# Patient Record
Sex: MALE | Race: WHITE | NOT HISPANIC OR LATINO | Employment: OTHER | URBAN - METROPOLITAN AREA
[De-identification: names, ages, dates, MRNs, and addresses within clinical notes are randomized per-mention and may not be internally consistent; named-entity substitution may affect disease eponyms.]

---

## 2019-08-30 ENCOUNTER — OFFICE VISIT (OUTPATIENT)
Dept: URGENT CARE | Age: 69
End: 2019-08-30
Payer: MEDICARE

## 2019-08-30 VITALS
SYSTOLIC BLOOD PRESSURE: 176 MMHG | WEIGHT: 240 LBS | HEIGHT: 72 IN | HEART RATE: 92 BPM | DIASTOLIC BLOOD PRESSURE: 96 MMHG | RESPIRATION RATE: 18 BRPM | BODY MASS INDEX: 32.51 KG/M2 | TEMPERATURE: 97.6 F | OXYGEN SATURATION: 96 %

## 2019-08-30 DIAGNOSIS — M54.16 LUMBAR RADICULOPATHY: Primary | ICD-10-CM

## 2019-08-30 DIAGNOSIS — S39.012A STRAIN OF LUMBAR REGION, INITIAL ENCOUNTER: ICD-10-CM

## 2019-08-30 PROCEDURE — 99203 OFFICE O/P NEW LOW 30 MIN: CPT | Performed by: PHYSICIAN ASSISTANT

## 2019-08-30 PROCEDURE — G0463 HOSPITAL OUTPT CLINIC VISIT: HCPCS | Performed by: PHYSICIAN ASSISTANT

## 2019-08-30 RX ORDER — PEN NEEDLE, DIABETIC 32GX 5/32"
NEEDLE, DISPOSABLE MISCELLANEOUS
COMMUNITY
Start: 2019-07-11

## 2019-08-30 RX ORDER — ASPIRIN 81 MG/1
1 TABLET ORAL DAILY
COMMUNITY
Start: 2010-12-10

## 2019-08-30 RX ORDER — BIOTIN 1 MG
4000 TABLET ORAL DAILY
COMMUNITY
Start: 2012-03-26

## 2019-08-30 RX ORDER — CHLORAL HYDRATE 500 MG
2 CAPSULE ORAL 2 TIMES DAILY
COMMUNITY

## 2019-08-30 RX ORDER — PREDNISONE 50 MG/1
50 TABLET ORAL DAILY
Qty: 5 TABLET | Refills: 0 | Status: SHIPPED | OUTPATIENT
Start: 2019-08-30

## 2019-08-30 RX ORDER — PIOGLITAZONEHYDROCHLORIDE 15 MG/1
15 TABLET ORAL DAILY
COMMUNITY
Start: 2019-03-29 | End: 2020-03-28

## 2019-08-30 RX ORDER — LISINOPRIL 20 MG/1
TABLET ORAL
COMMUNITY
Start: 2019-07-16

## 2019-08-30 RX ORDER — ATORVASTATIN CALCIUM 80 MG/1
80 TABLET, FILM COATED ORAL DAILY
COMMUNITY
Start: 2019-02-25 | End: 2020-02-25

## 2019-08-30 RX ORDER — FENOFIBRATE 145 MG/1
145 TABLET, COATED ORAL DAILY
COMMUNITY
Start: 2019-02-25 | End: 2020-02-25

## 2019-08-30 RX ORDER — ESOMEPRAZOLE MAGNESIUM 40 MG/1
40 CAPSULE, DELAYED RELEASE ORAL DAILY
COMMUNITY
Start: 2017-06-19

## 2019-08-30 NOTE — PATIENT INSTRUCTIONS
Follow up with PCP in 3-5 days  Proceed to  ER if symptoms worsen  Patient will begin steroids as directed  Advised patient that his glucose levels may rise with the steroid use  Keep a close eye on levels  Referral to physical therapy and spine specialist      Acute Low Back Pain, Ambulatory Care   GENERAL INFORMATION:   Acute low back pain  is discomfort in your lower back area that lasts for less than 12 weeks  The word acute is used to describe pain that starts suddenly, worsens quickly, and lasts for a short time  Common symptoms include the following:   · Back stiffness or spasms    · Pain down the back or side of one leg    · Holding yourself in an unusual position or posture to decrease your back pain    · Not being able to find a sitting position that is comfortable    · Slow increase in your pain for 24 to 48 hours after you stress your back    · Tenderness on your lower back or severe pain when you move your back  Seek immediate care for the following symptoms:   · Severe pain    · Sudden stiffness and heaviness in both buttocks down to both legs    · Numbness or weakness in one leg, or pain in both legs    · Numbness in your genital area or across your lower back    · Unable to control your urine or bowel movements  Treatment for acute low back pain  may include any of the following:  · Medicines:      ¨ NSAIDs  help decrease swelling and pain or fever  This medicine is available with or without a doctor's order  NSAIDs can cause stomach bleeding or kidney problems in certain people  If you take blood thinner medicine, always ask your healthcare provider if NSAIDs are safe for you  Always read the medicine label and follow directions  ¨ Muscle relaxers  help decrease muscle spasms pain  ¨ Prescription pain medicine  may be given  Ask how to take this medicine safely  · Surgery  may be needed if your pain is severe and other treatments do not work   Surgery may be needed for conditions of the lumbar spine, such as herniated disc or spinal stenosis  Manage your symptoms:   · Sleep on a firm mattress  If you do not have a firm mattress, have someone move your mattress to the floor for a few days  A piece of plywood under your mattress can also help make it firmer  · Apply ice  on your lower back for 15 to 20 minutes every hour or as directed  Use an ice pack, or put crushed ice in a plastic bag  Cover it with a towel  Ice helps prevent tissue damage and decreases swelling and pain  You can alternate ice and heat  · Apply heat  on your lower back for 20 to 30 minutes every 2 hours for as many days as directed  Heat helps decrease pain and muscle spasms  · Go to physical therapy  A physical therapist teaches you exercises to help improve movement and strength, and to decrease pain  Prevent acute low back pain:   · Use proper body mechanics  ¨ Bend at the hips and knees when you  objects  Do not bend from the waist  Use your leg muscles as you lift the load  Do not use your back  Keep the object close to your chest as you lift it  Try not to twist or lift anything above your waist     ¨ Change your position often when you stand for long periods of time  Rest one foot on a small box or footrest, and then switch to the other foot often  ¨ Try not to sit for long periods of time  When you do, sit in a straight-backed chair with your feet flat on the floor  Never reach, pull, or push while you are sitting  · Exercise regularly  Warm up before you exercise  Do exercises that strengthen your back muscles  Ask about the best exercise plan for you  · Maintain a healthy weight  Ask your healthcare provider how much you should weigh  Ask him to help you create a weight loss plan if you are overweight  Follow up with your healthcare provider as directed:  Return for a follow-up visit if you still have pain after 1 to 3 weeks of treatment   You may need to visit an orthopedist if your back pain lasts more than 6 to 12 weeks  Write down your questions so you remember to ask them during your visits  CARE AGREEMENT:   You have the right to help plan your care  Learn about your health condition and how it may be treated  Discuss treatment options with your caregivers to decide what care you want to receive  You always have the right to refuse treatment  The above information is an  only  It is not intended as medical advice for individual conditions or treatments  Talk to your doctor, nurse or pharmacist before following any medical regimen to see if it is safe and effective for you  © 2014 [address] is for End User's use only and may not be sold, redistributed or otherwise used for commercial purposes  All illustrations and images included in CareNotes® are the copyrighted property of A D A M [employer]  or [employer]

## 2019-08-30 NOTE — PROGRESS NOTES
[facility]        NAME: Shantel Moore is a 80 y o  male  : 1950    MRN: [id number]  DATE: 2019  TIME: 12:14 PM    Assessment and Plan   Lumbar radiculopathy [M54 16]  1  Lumbar radiculopathy  predniSONE 50 mg tablet    Ambulatory referral to Physical Therapy    Ambulatory Referral to [facility]   2  Strain of lumbar region, initial encounter  predniSONE 50 mg tablet    Ambulatory referral to Physical Therapy    Ambulatory Referral to [facility]         Patient Instructions       Follow up with PCP in 3-5 days  Proceed to  ER if symptoms worsen  Patient will begin steroids as directed  Advised patient that his glucose levels may rise with the steroid use  Keep a close eye on levels  Referral to physical therapy and spine specialist      Chief Complaint     Chief Complaint   Patient presents with    Back Pain     Pt c/o dull, aching left lower back pain that radiates down his left thigh and to his knee for the past 5 days  Denies injury  Pt has been taking Advil and ALeve for symptoms  Pt has hx of sciatica  History of Present Illness       Patient is a 79 yo male presenting to the office for an initial evaluation for his lumbar spine  Patient states that the pain has been ongoing for the past 4-5 days with no injury  Patient states that he has a prior history of sciatica which was not treated medically  Patient states that the pain is constant and becomes worse with ROM of the spine and at night  Patient states that the pain does radiate into the left thigh  Patient has been icing the back and has taken ibuprofen with minimal relief  Back Pain   Associated symptoms include paresthesias and tingling  Pertinent negatives include no abdominal pain, bladder incontinence, bowel incontinence, chest pain, fever, headaches, leg pain, numbness, weakness or weight loss         Review of Systems   Review of Systems   Constitutional: Negative for chills, fatigue, fever and weight loss  HENT: Negative  Eyes: Negative  Respiratory: Negative  Cardiovascular: Negative  Negative for chest pain  Gastrointestinal: Negative  Negative for abdominal pain and bowel incontinence  Endocrine: Negative  Genitourinary: Negative  Negative for bladder incontinence  Musculoskeletal: Positive for back pain and myalgias  Negative for arthralgias, joint swelling, neck pain and neck stiffness  Skin: Negative  Allergic/Immunologic: Negative  Neurological: Positive for tingling and paresthesias  Negative for dizziness, tremors, seizures, weakness, numbness and headaches  Hematological: Negative  Psychiatric/Behavioral: Negative  Current Medications       Current Outpatient Medications:     aspirin (ECOTRIN LOW STRENGTH) 81 mg EC tablet, Take 1 tablet by mouth daily, Disp: , Rfl:     atorvastatin (LIPITOR) 80 mg tablet, Take 80 mg by mouth daily, Disp: , Rfl:     Cholecalciferol (VITAMIN D3) 1000 units CAPS, Take 4,000 Units by mouth daily, Disp: , Rfl:     Cyanocobalamin 1000 MCG CAPS, Take 1 tablet by mouth daily, Disp: , Rfl:     esomeprazole (NEXIUM) 40 MG capsule, Take 40 mg by mouth daily, Disp: , Rfl:     fenofibrate (TRICOR) 145 mg tablet, Take 145 mg by mouth daily, Disp: , Rfl:     insulin aspart (NovoLOG) 100 Units/mL injection pen, Inject 32 units with breakfast, 22 units with lunch & 26 units with dinner Dx:E11 65, Disp: , Rfl:     insulin glargine (BASAGLAR KWIKPEN) 100 units/mL injection pen, Inject 86 units daily  E11 65, Disp: , Rfl:     lisinopril (ZESTRIL) 20 mg tablet, TAKE 1 TABLET DAILY, Disp: , Rfl:     metFORMIN (GLUCOPHAGE) 500 mg tablet, Take 2 tabs in the morning with breakfast and 3 tabs in the evening with dinner   E11 65, Disp: , Rfl:     pioglitazone (ACTOS) 15 mg tablet, Take 15 mg by mouth daily, Disp: , Rfl:     sitaGLIPtin-metFORMIN (JANUMET)  MG per tablet, Take 1 tablet by mouth 2 (two) times a day with meals, Disp: , Rfl:     BD PEN NEEDLE BEREKET U/F 32G X 4 MM MISC, , Disp: , Rfl:     lisinopril (ZESTRIL) 20 mg tablet, , Disp: , Rfl:     Omega-3 1000 MG CAPS, Take 2 g by mouth 2 (two) times a day, Disp: , Rfl:     predniSONE 50 mg tablet, Take 1 tablet (50 mg total) by mouth daily, Disp: 5 tablet, Rfl: 0    Current Allergies     Allergies as of 08/30/2019    (No Known Allergies)            The following portions of the patient's history were reviewed and updated as appropriate: allergies, current medications, past family history, past medical history, past social history, past surgical history and problem list      Past Medical History:   Diagnosis Date    Acid reflux     Diabetes mellitus type II, controlled ([facility] [address] )     Hypertension        History reviewed  No pertinent surgical history  History reviewed  No pertinent family history  Medications have been verified  Objective   BP (!) 176/96   Pulse 92   Temp 97 6 °F (36 4 °C)   Resp 18   Ht 5' 11 5" (1 816 m)   Wt 109 kg (240 lb)   SpO2 96%   BMI 33 01 kg/m²        Physical Exam     Physical Exam   Constitutional: He appears well-developed and well-nourished  HENT:   Head: Normocephalic  Neck: Normal range of motion  Cardiovascular: Normal rate, regular rhythm and normal heart sounds  Pulmonary/Chest: Effort normal and breath sounds normal    Abdominal: Soft  Bowel sounds are normal    Musculoskeletal:        Lumbar back: He exhibits decreased range of motion (mild decrease in flexion, pain with rotation ) and tenderness (left paraspinal )  He exhibits no bony tenderness, no swelling and no edema  Back:    Neurological: He is alert  Skin: Skin is warm  Capillary refill takes less than 2 seconds  Psychiatric: He has a normal mood and affect   His behavior is normal  Judgment and thought content normal

## 2019-09-05 ENCOUNTER — TELEPHONE (OUTPATIENT)
Dept: PHYSICAL THERAPY | Facility: OTHER | Age: 69
End: 2019-09-05

## 2019-09-10 ENCOUNTER — TELEPHONE (OUTPATIENT)
Dept: PHYSICAL THERAPY | Facility: OTHER | Age: 69
End: 2019-09-10

## 2019-09-10 NOTE — TELEPHONE ENCOUNTER
Returned patients call and 2nd voice message left for patient to call back  Phone number and hours of business provided

## 2019-09-12 ENCOUNTER — TRANSCRIBE ORDERS (OUTPATIENT)
Dept: ADMINISTRATIVE | Facility: HOSPITAL | Age: 69
End: 2019-09-12

## 2019-09-12 DIAGNOSIS — M54.16 LUMBAR RADICULOPATHY: Primary | ICD-10-CM

## 2019-09-19 ENCOUNTER — HOSPITAL ENCOUNTER (OUTPATIENT)
Dept: MRI IMAGING | Facility: HOSPITAL | Age: 69
Discharge: HOME | End: 2019-09-19
Payer: MEDICARE

## 2019-09-19 DIAGNOSIS — M54.16 LUMBAR RADICULOPATHY: ICD-10-CM

## 2019-09-19 PROCEDURE — 72148 MRI LUMBAR SPINE W/O DYE: CPT

## 2019-09-20 ENCOUNTER — TELEPHONE (OUTPATIENT)
Dept: URGENT CARE | Age: 69
End: 2019-09-20

## 2019-09-20 NOTE — TELEPHONE ENCOUNTER
Julianna Fisher PA-C called primary phone number and left a message for him to call back  Later on a [employer] called saying the phone number did not belong to a "[employer]" and she didn't know them  Julianna Fisher did call the secondary phone number and a woman answered saying there was a [employer] that lived there and would have him call back  At 3:50pm Antony Lux called back while Julianna Fisher was unavailable  Julianna Fisher please call [employer] back at [phone]

## 2019-09-20 NOTE — TELEPHONE ENCOUNTER
Call completed  Patient will be referred to ortho for further evaluation  Discussed results of MRI completely  I never ordered an MRI for this patient so I have no idea how he was able to get the MRI  I referred patient to [facility] when he was evaluated by me on 8/30  [facility] attempted to call him multiple times with no response  Patient eventually went to [facility] where he was treated for the same radicular symptoms and was given an NSAID and muscle relaxer  Patient states that he is doing well overall  Patient will be referred to ortho for further evaluation  Again I never ordered this MRI

## 2019-10-21 VITALS
BODY MASS INDEX: 33.6 KG/M2 | DIASTOLIC BLOOD PRESSURE: 88 MMHG | HEART RATE: 109 BPM | WEIGHT: 240 LBS | SYSTOLIC BLOOD PRESSURE: 155 MMHG | HEIGHT: 71 IN

## 2019-10-21 DIAGNOSIS — M54.16 RADICULOPATHY, LUMBAR REGION: ICD-10-CM

## 2019-10-21 DIAGNOSIS — M54.42 ACUTE BILATERAL LOW BACK PAIN WITH BILATERAL SCIATICA: Primary | ICD-10-CM

## 2019-10-21 DIAGNOSIS — M54.41 ACUTE BILATERAL LOW BACK PAIN WITH BILATERAL SCIATICA: Primary | ICD-10-CM

## 2019-10-21 PROCEDURE — 99203 OFFICE O/P NEW LOW 30 MIN: CPT | Performed by: ORTHOPAEDIC SURGERY

## 2019-10-21 RX ORDER — GABAPENTIN 300 MG/1
300 CAPSULE ORAL
Qty: 30 CAPSULE | Refills: 1 | Status: SHIPPED | OUTPATIENT
Start: 2019-10-21

## 2019-10-21 NOTE — ASSESSMENT & PLAN NOTE
Patient presents for evaluation of acute on chronic left lower extremity and lower back pain  He reports having chronic left-sided lower back pain and in the last 2 weeks some radiation down the left leg into his foot  He does have a history of multilevel lumbar DDD as well as insulin-dependent diabetes  He states he gets a heavy sensation in bilateral legs left side worse than right  Symptoms are worse with standing and sometimes walking  Denies luis weakness  Does not report incontinence of bowel or bladder    In the seated position he demonstrates 5/5 strength L2-S1 bilaterally  Sensation is grossly intact to light touch  Reflexes are hypoactive at L4 and S1 symmetrically  He does ambulate independently without assistive devices  Minimally tender to palpation left lumbosacral spine without crepitus  Lumbar MRI is reviewed and this demonstrates multilevel lumbar DDD with moderate stenosis at L4-5    Assessment and plan  Lower back and left leg pain in the setting of multilevel lumbar DDD with associated stenosis  I will initiate physical therapy at this point  Start gabapentin 300 mg once at night  Follow-up in 6 weeks for re-evaluation    If no improvement we will consider injections

## 2019-10-21 NOTE — PROGRESS NOTES
Assessment/Plan:    Radiculopathy, lumbar region  Patient presents for evaluation of acute on chronic left lower extremity and lower back pain  He reports having chronic left-sided lower back pain and in the last 2 weeks some radiation down the left leg into his foot  He does have a history of multilevel lumbar DDD as well as insulin-dependent diabetes  He states he gets a heavy sensation in bilateral legs left side worse than right  Symptoms are worse with standing and sometimes walking  Denies luis weakness  Does not report incontinence of bowel or bladder    In the seated position he demonstrates 5/5 strength L2-S1 bilaterally  Sensation is grossly intact to light touch  Reflexes are hypoactive at L4 and S1 symmetrically  He does ambulate independently without assistive devices  Minimally tender to palpation left lumbosacral spine without crepitus  Lumbar MRI is reviewed and this demonstrates multilevel lumbar DDD with moderate stenosis at L4-5    Assessment and plan  Lower back and left leg pain in the setting of multilevel lumbar DDD with associated stenosis  I will initiate physical therapy at this point  Start gabapentin 300 mg once at night  Follow-up in 6 weeks for re-evaluation  If no improvement we will consider injections      · Acute low back pain with left > right leg pain  · Patient is diabetic   · Start physical therapy  · Start gabapentin 300mg 1x/night  · Follow up in 6 weeks       Problem List Items Addressed This Visit        Nervous and Auditory    Radiculopathy, lumbar region     Patient presents for evaluation of acute on chronic left lower extremity and lower back pain  He reports having chronic left-sided lower back pain and in the last 2 weeks some radiation down the left leg into his foot  He does have a history of multilevel lumbar DDD as well as insulin-dependent diabetes  He states he gets a heavy sensation in bilateral legs left side worse than right    Symptoms are worse with standing and sometimes walking  Denies luis weakness  Does not report incontinence of bowel or bladder    In the seated position he demonstrates 5/5 strength L2-S1 bilaterally  Sensation is grossly intact to light touch  Reflexes are hypoactive at L4 and S1 symmetrically  He does ambulate independently without assistive devices  Minimally tender to palpation left lumbosacral spine without crepitus  Lumbar MRI is reviewed and this demonstrates multilevel lumbar DDD with moderate stenosis at L4-5    Assessment and plan  Lower back and left leg pain in the setting of multilevel lumbar DDD with associated stenosis  I will initiate physical therapy at this point  Start gabapentin 300 mg once at night  Follow-up in 6 weeks for re-evaluation  If no improvement we will consider injections         Relevant Medications    gabapentin (NEURONTIN) 300 mg capsule    Other Relevant Orders    Ambulatory referral to Physical Therapy       Other    Acute bilateral low back pain with bilateral sciatica - Primary    Relevant Medications    gabapentin (NEURONTIN) 300 mg capsule    Other Relevant Orders    Ambulatory referral to Physical Therapy            Subjective:      Patient ID: Bernice Yang is a 80 y o  male  HPI  The patient presents for initial evaluation of low back pain with left leg pain  He has had symptoms for 2 weeks without injury  Today he complains of intermittent  Minimal low back pain with left > right posterolateral thigh, calf and lateral foot pain  He rates his pain at 3/10 and 7/10 at its worse  He is unsure what aggravates  Medications alleviate  He does use ibuprofen and blue emu with benefit  He denies past lumbar injections or surgery        The following portions of the patient's history were reviewed and updated as appropriate: allergies, current medications, past family history, past medical history, past social history, past surgical history and problem list     Review of Systems   Constitutional: Negative for chills, fever and unexpected weight change  HENT: Negative for hearing loss, nosebleeds and sore throat  Eyes: Negative for pain, redness and visual disturbance  Respiratory: Negative for cough, shortness of breath and wheezing  Cardiovascular: Negative for chest pain, palpitations and leg swelling  Gastrointestinal: Negative for abdominal pain, nausea and vomiting  Endocrine: Negative for polydipsia and polyuria  Genitourinary: Negative for difficulty urinating and hematuria  Skin: Negative for rash and wound  Psychiatric/Behavioral: Negative for decreased concentration and suicidal ideas  The patient is not nervous/anxious  Objective:      /88   Pulse (!) 109   Ht 5' 11" (1 803 m)   Wt 109 kg (240 lb)   BMI 33 47 kg/m²          Physical Exam   Constitutional: He is oriented to person, place, and time  He appears well-developed and well-nourished  HENT:   Right Ear: External ear normal    Left Ear: External ear normal    Nose: Nose normal    Eyes: Conjunctivae are normal    Neck: Normal range of motion  Pulmonary/Chest: Effort normal    Neurological: He is alert and oriented to person, place, and time  Skin: Skin is warm and dry  Psychiatric: He has a normal mood and affect  His behavior is normal  Judgment and thought content normal        Patient ambulates without assistance  Tender to palpation over lumbosacral junction   Modified straight leg raise negative bilaterally  Strength L2-S1 5/5 bilaterally   Sensation L2-S1 intact bilaterally   Reflexes 2+ at L4 and at S1      Imaging:  Lumbar MRI 9/19/19 - multilevel degenerative changes        Scribe Attestation    I,:   Sandy Linn am acting as a scribe while in the presence of the attending physician :        I,:   Ca Wing MD personally performed the services described in this documentation    as scribed in my presence :

## 2019-10-24 ENCOUNTER — EVALUATION (OUTPATIENT)
Dept: PHYSICAL THERAPY | Facility: REHABILITATION | Age: 69
End: 2019-10-24
Payer: MEDICARE

## 2019-10-24 DIAGNOSIS — M54.42 ACUTE BILATERAL LOW BACK PAIN WITH BILATERAL SCIATICA: ICD-10-CM

## 2019-10-24 DIAGNOSIS — M54.16 RADICULOPATHY, LUMBAR REGION: ICD-10-CM

## 2019-10-24 DIAGNOSIS — M54.41 ACUTE BILATERAL LOW BACK PAIN WITH BILATERAL SCIATICA: ICD-10-CM

## 2019-10-24 PROCEDURE — 97112 NEUROMUSCULAR REEDUCATION: CPT | Performed by: PHYSICAL THERAPIST

## 2019-10-24 PROCEDURE — 97162 PT EVAL MOD COMPLEX 30 MIN: CPT | Performed by: PHYSICAL THERAPIST

## 2019-10-24 NOTE — PROGRESS NOTES
PT Evaluation     Today's date: 10/24/2019  Patient name: Jana Leyden  : 1950  MRN: [id number]  Referring provider: Colton Parrish MD  Dx:   Encounter Diagnosis     ICD-10-CM    1  Acute bilateral low back pain with bilateral sciatica M54 42 Ambulatory referral to Physical Therapy    M54 41    2  Radiculopathy, lumbar region M54 16 Ambulatory referral to Physical Therapy                  Assessment  Assessment details: Pt is a 81 yo male with acute LBP and discomfort in both thighs and intermittent pain down the left leg below the knee  He has not been able to return to his fitness program and sleep has been disturbed  MRI shows mild disc bulging with possible encroachment on the left L4 nerve root and bilateral  L5 nerve roots  He came into the clinic with very mild thigh pain and left low back pain with SB and extension  Pain resolved with repeated extension in lying and he was then able to extend and side bend without pain  His spine is quite stiff with very little motion into extension  He would benefit from joint mobilization, therapeutic exercise, and posture and body mechanics instructions to reduce the derangement and restore functional mobility  Impairments: activity intolerance, lacks appropriate home exercise program, pain with function and poor body mechanics    Symptom irritability: lowUnderstanding of Dx/Px/POC: excellent  Goals  STG: in 4 weeks  Increase spinal extension to moderate limite  Performing HEP consistently  Pain <5/10  LTG: by time of D/C  Pain 0/10    Increase FOTO score 9 points  Resume his fitness program    Plan  Patient would benefit from: skilled physical therapy  Planned therapy interventions: patient education, joint mobilization, manual therapy, strengthening, stretching, neuromuscular re-education and home exercise program  Frequency: 2x week  Duration in weeks: 12  Treatment plan discussed with: patient        Subjective Evaluation    History of Present Illness  Mechanism of injury: Low back pain began 2-3 months ago  He could not find a comfortable position to sleep  Notes constant discomfort in both thighs that is constant but is best in sitting  L>R Low back pain is intermittent  LBP is becoming less frequent  Pain  Current pain rating: 3  At best pain ratin  At worst pain ratin  Location: anterior thights and medial calf , left sided LBP  Relieving factors: rest  Aggravating factors: standing and walking    Social Support  Lives in: multiple-level home      Diagnostic Tests  MRI studies: abnormal  Treatments  Previous treatment: medication  Current treatment: medication  Patient Goals  Patient goals for therapy: decreased pain  Patient goal: resume exercise program        Objective     Postural Observations  Seated posture: fair  Standing posture: good  Correction of posture: has no consistent effect    Additional Postural Observation Details  Decreased lordosis    Palpation   Left   Hypertonic in the erector spinae  Right   Hypertonic in the erector spinae  Tenderness     Lumbar Spine  Tenderness in the left transverse process (L L4,5)       Neurological Testing     Sensation     Lumbar   Left   Intact: light touch    Right   Intact: light touch    Active Range of Motion     Lumbar   Flexion:  WFL  Extension:  Restriction level: maximal  Left lateral flexion:  with pain Restriction level: minimal  Right lateral flexion:  Restriction level: minimal    Joint Play     Hypomobile: T8, T9, T10, T11, T12, L1, L2, L3, L4, L5 and S1     Pain: L4 and L5   Mechanical Assessment    Cervical      Thoracic      Lumbar    Standing flexion: repeated movements   Pain location:no change  Lying flexion: repeated movements  Pain location: no change  Pain level: produced  Standing extension: repeated movements  Pain location: no change  Pain level: produced  Lying extension: repeated movements  Pain level: decreased    Strength/Myotome Testing     Lumbar   Left Normal strength    Right   Normal strength    Tests     Lumbar   Negative SIJ compression, sacroiliac distraction and sacral spring   Left   Negative crossed SLR, femoral stretch, passive SLR and slump test      Right   Negative crossed SLR, femoral stretch, passive SLR and slump test      Left Pelvic Girdle/Sacrum   Negative: thigh thrust      Right Pelvic Girdle/Sacrum   Negative: thigh thrust      Left Hip   Positive FADIR  Negative BLAIR  Right Hip   Negative BLAIR and FADIR                Precautions: DM, HTN      Manual  10/24            PA glides L3,4,5 Gr III 20" 3x            Left Unilat L3, L4 Gr III 20" 3x                                                       Exercise Diary  10/24            Prone press ups 2x10            maryjane abdom NV            DLS alt LE NV            Hamstring stretch                                                                                                                                                                                                                                 Modalities

## 2019-10-28 ENCOUNTER — OFFICE VISIT (OUTPATIENT)
Dept: PHYSICAL THERAPY | Facility: REHABILITATION | Age: 69
End: 2019-10-28
Payer: MEDICARE

## 2019-10-28 DIAGNOSIS — M54.42 ACUTE BILATERAL LOW BACK PAIN WITH BILATERAL SCIATICA: Primary | ICD-10-CM

## 2019-10-28 DIAGNOSIS — M54.41 ACUTE BILATERAL LOW BACK PAIN WITH BILATERAL SCIATICA: Primary | ICD-10-CM

## 2019-10-28 DIAGNOSIS — M54.16 RADICULOPATHY, LUMBAR REGION: ICD-10-CM

## 2019-10-28 PROCEDURE — 97140 MANUAL THERAPY 1/> REGIONS: CPT | Performed by: PHYSICAL THERAPIST

## 2019-10-28 PROCEDURE — 97112 NEUROMUSCULAR REEDUCATION: CPT | Performed by: PHYSICAL THERAPIST

## 2019-10-28 NOTE — PROGRESS NOTES
Daily Note     Today's date: 10/28/2019  Patient name: Lynn Grier  : 1950  MRN: [id number]  Referring provider: Harper Carbajal MD  Dx:   Encounter Diagnosis     ICD-10-CM    1  Acute bilateral low back pain with bilateral sciatica M54 42     M54 41    2  Radiculopathy, lumbar region M54 16                   Subjective: He has been consistently performing his exercises and is feeling better  Noted a little bit of anterior thigh pain yesterday  Objective: See treatment diary below  Reviewed body mechanics for helping his wife up from the recliner  Assessment: Lumbar ROM was pain free this morning  Still restricted in flexion and extension  Pt tolerated progression of HEP well  Thighs fatigued with squats  Plan: Continue per plan of care        Precautions: DM, HTN      Manual  10/24 10/28           PA glides L3,4,5 Gr III 20" 3x Gr IV 20" 3x           Left Unilat L3, L4 Gr III 20" 3x Gr  IV 20" 3x                                                      Exercise Diary  10/24 10/28           Prone press ups 2x10 10  10w/exhale  10 w/op           maryjane abdom NV 10" 10x           DLS alt LE NV            Hamstring stretch  20" 5x           KB squats to  mat  2x10                                                                                                                                                                                                                  Modalities

## 2019-10-31 ENCOUNTER — OFFICE VISIT (OUTPATIENT)
Dept: PHYSICAL THERAPY | Facility: REHABILITATION | Age: 69
End: 2019-10-31
Payer: MEDICARE

## 2019-10-31 DIAGNOSIS — M54.41 ACUTE BILATERAL LOW BACK PAIN WITH BILATERAL SCIATICA: Primary | ICD-10-CM

## 2019-10-31 DIAGNOSIS — M54.16 RADICULOPATHY, LUMBAR REGION: ICD-10-CM

## 2019-10-31 DIAGNOSIS — M54.42 ACUTE BILATERAL LOW BACK PAIN WITH BILATERAL SCIATICA: Primary | ICD-10-CM

## 2019-10-31 PROCEDURE — 97140 MANUAL THERAPY 1/> REGIONS: CPT

## 2019-10-31 PROCEDURE — 97112 NEUROMUSCULAR REEDUCATION: CPT

## 2019-10-31 NOTE — PROGRESS NOTES
Daily Note     Today's date: 10/31/2019  Patient name: Jackeline Palafox  : 1950  MRN: [id number]  Referring provider: Delmi Gill MD  Dx:   Encounter Diagnosis     ICD-10-CM    1  Acute bilateral low back pain with bilateral sciatica M54 42     M54 41    2  Radiculopathy, lumbar region M54 16                   Subjective: He has been having some pain into L thigh at times yet   He does have it on B sides at time  Extension has been helping  He states he has been better about his posture    Objective: See treatment diary below    Assessment:    No pain into legs after treatment      Plan: Continue per plan of care        Precautions: DM, HTN      Manual  10/24 10/28 10-31          PA glides L3,4,5 Gr III 20" 3x Gr IV 20" 3x NMT          Left Unilat L3, L4 Gr III 20" 3x Gr  IV 20" 3x NMT                                                     Exercise Diary  10/24 10/28 10-31          Prone press ups 2x10 10  10w/exhale  10 w/op  2 setsof 10x w OP working to end range                                          maryjane abdom NV 10" 10x x          DLS alt LE NV  5" F47          Hamstring stretch  20" 5x x          KB squats to  mat  2x10 x                       Prone ham curls   B   2x10                                                                                                                                                                                       Modalities

## 2019-11-05 ENCOUNTER — OFFICE VISIT (OUTPATIENT)
Dept: PHYSICAL THERAPY | Facility: REHABILITATION | Age: 69
End: 2019-11-05
Payer: MEDICARE

## 2019-11-05 DIAGNOSIS — M54.16 RADICULOPATHY, LUMBAR REGION: ICD-10-CM

## 2019-11-05 DIAGNOSIS — M54.41 ACUTE BILATERAL LOW BACK PAIN WITH BILATERAL SCIATICA: Primary | ICD-10-CM

## 2019-11-05 DIAGNOSIS — M54.42 ACUTE BILATERAL LOW BACK PAIN WITH BILATERAL SCIATICA: Primary | ICD-10-CM

## 2019-11-05 PROCEDURE — 97140 MANUAL THERAPY 1/> REGIONS: CPT

## 2019-11-05 PROCEDURE — 97112 NEUROMUSCULAR REEDUCATION: CPT

## 2019-11-05 NOTE — PROGRESS NOTES
Daily Note     Today's date: 2019  Patient name: Kapil Patten  : 1950  MRN: [id number]  Referring provider: Demian Osborne MD  Dx:   Encounter Diagnosis     ICD-10-CM    1  Acute bilateral low back pain with bilateral sciatica M54 42     M54 41    2  Radiculopathy, lumbar region M54 16                   Subjective: Patient noted no new complaints patient noted continued  no low back pain or radicular sx's down leg  Objective: See treatment diary below      Assessment: Tolerated treatment well  Added bridges and bicycle legs into treatment with no pain noted  Patient exhibited good technique with therapeutic exercises and would benefit from continued PT  Patient has good compliance with HEP  Plan:  Patient is responding well to treatments with no pain  Patient plan is to  appointment to  assess patient response and to make sure still pain free next visit        Precautions: DM, HTN      Manual  10/24 10/28 10-31 11/5         PA glides L3,4,5 Gr III 20" 3x Gr IV 20" 3x NMT NMT         Left Unilat L3, L4 Gr III 20" 3x Gr  IV 20" 3x NMT NMT                                                    Exercise Diary  10/24 10/28 10-31 11/5         Prone press ups 2x10 10  10w/exhale  10 w/op  2 setsof 10x w OP working to end range                                 10  10w/exhale  10 w/op         maryjane abdom NV 10" 10x x x         DLS alt LE NV  5" E50 5"x10         Hamstring stretch  20" 5x x x         KB squats to  mat  2x10 x x                      Prone ham curls   B   2x10 x         bridges    5"x10         Bicycle legs    20 xea                                                                                                                                                             Modalities

## 2019-11-07 ENCOUNTER — APPOINTMENT (OUTPATIENT)
Dept: PHYSICAL THERAPY | Facility: REHABILITATION | Age: 69
End: 2019-11-07
Payer: MEDICARE

## 2019-11-12 ENCOUNTER — OFFICE VISIT (OUTPATIENT)
Dept: PHYSICAL THERAPY | Facility: REHABILITATION | Age: 69
End: 2019-11-12
Payer: MEDICARE

## 2019-11-12 DIAGNOSIS — M54.41 ACUTE BILATERAL LOW BACK PAIN WITH BILATERAL SCIATICA: Primary | ICD-10-CM

## 2019-11-12 DIAGNOSIS — M54.42 ACUTE BILATERAL LOW BACK PAIN WITH BILATERAL SCIATICA: Primary | ICD-10-CM

## 2019-11-12 DIAGNOSIS — M54.16 RADICULOPATHY, LUMBAR REGION: ICD-10-CM

## 2019-11-12 PROCEDURE — 97112 NEUROMUSCULAR REEDUCATION: CPT | Performed by: PHYSICAL THERAPIST

## 2019-11-12 PROCEDURE — 97110 THERAPEUTIC EXERCISES: CPT | Performed by: PHYSICAL THERAPIST

## 2019-11-12 NOTE — PROGRESS NOTES
Daily Note     Today's date: 2019  Patient name: Jaren Dawn  : 1950  MRN: [id number]  Referring provider: Verona Gusman MD  Dx:   Encounter Diagnosis     ICD-10-CM    1  Acute bilateral low back pain with bilateral sciatica M54 42     M54 41    2  Radiculopathy, lumbar region M54 16                   Subjective: Patient reports that he has not had any LBP for more than a week and no leg pain for several days  He has not yet resumed his fitness program        Objective: See treatment diary below      Assessment: Pt is no longer having symptoms  ROM is still mildly restricted in extension and right side bending but they are pain free  He was able to walk quickly on the treadmill without pain and appears ready to resume his fitness program   He will begin his home fitness program and follow up with PT only as needed  Plan:  Potential D/C to HEP         Precautions: DM, HTN      Manual  10/24 10/28 10-31 11/5 11/12        PA glides L3,4,5 Gr III 20" 3x Gr IV 20" 3x NMT NMT         Left Unilat L3, L4 Gr III 20" 3x Gr  IV 20" 3x NMT NMT                                                    Exercise Diary  10/24 10/28 10-31 11/5 11        Prone press ups 2x10 10  10w/exhale  10 w/op  2 setsof 10x w OP working to end range                                 10  10w/exhale  10 w/op 10x2        maryjane abdom NV 10" 10x x x HEP        DLS alt LE NV  5" J50 5"x10         Hamstring stretch  20" 5x x x x        KB squats to  mat  2x10 x x 10# 2x10                     Prone ham curls   B   2x10 x x        bridges    5"x10 20x        Bicycle legs    20 xea  2x20        Side planks     10" 5x                                                                                                                                              Modalities

## 2019-11-29 NOTE — PROGRESS NOTES
Theta Ache has been compliant with attending PT and home exercise program since initial eval   Rana Slight  has made improvements in objective data since initial evalulation and has achieved all goals  Patient reports having returned to their prior level or function  Patient provided with updated Home Exercise Program, all questions answered, verbalized understanding and agreement to plan of care   Thus it was mutually decided to discontinue this episode of care and transition to Home Exercise Program